# Patient Record
Sex: MALE | Race: WHITE | NOT HISPANIC OR LATINO | Employment: FULL TIME | URBAN - METROPOLITAN AREA
[De-identification: names, ages, dates, MRNs, and addresses within clinical notes are randomized per-mention and may not be internally consistent; named-entity substitution may affect disease eponyms.]

---

## 2024-04-15 PROBLEM — F32.A DEPRESSION: Status: ACTIVE | Noted: 2024-04-15

## 2024-04-15 PROBLEM — R10.9 LEFT FLANK PAIN: Status: ACTIVE | Noted: 2024-04-15

## 2024-04-15 PROBLEM — S61.213A LACERATION OF LEFT MIDDLE FINGER WITHOUT FOREIGN BODY WITHOUT DAMAGE TO NAIL: Status: ACTIVE | Noted: 2024-04-15

## 2024-04-15 PROBLEM — S67.22XA: Status: ACTIVE | Noted: 2024-04-15

## 2024-04-15 PROBLEM — M54.81 OCCIPITAL NEURALGIA OF RIGHT SIDE: Status: ACTIVE | Noted: 2024-04-15

## 2024-04-15 PROBLEM — R53.83 FATIGUE: Status: ACTIVE | Noted: 2024-04-15

## 2024-04-15 PROBLEM — R05.9 COUGH: Status: ACTIVE | Noted: 2024-04-15

## 2024-04-15 PROBLEM — R68.82 LOW LIBIDO: Status: ACTIVE | Noted: 2024-04-15

## 2024-04-15 PROBLEM — R29.818 SUSPECTED SLEEP APNEA: Status: ACTIVE | Noted: 2024-04-15

## 2024-04-15 PROBLEM — E78.00 HYPERCHOLESTEREMIA: Status: ACTIVE | Noted: 2024-04-15

## 2024-04-15 PROBLEM — S99.922A FOOT INJURY, LEFT, INITIAL ENCOUNTER: Status: ACTIVE | Noted: 2024-04-15

## 2024-04-15 RX ORDER — TADALAFIL 20 MG/1
TABLET ORAL
COMMUNITY
Start: 2021-11-23

## 2024-04-15 RX ORDER — ONDANSETRON 4 MG/1
TABLET, FILM COATED ORAL EVERY 8 HOURS
COMMUNITY
Start: 2015-03-05

## 2024-04-15 RX ORDER — MUPIROCIN 20 MG/G
OINTMENT TOPICAL 3 TIMES DAILY
COMMUNITY
Start: 2020-07-30

## 2024-04-15 RX ORDER — BUPROPION HYDROCHLORIDE 300 MG/1
1 TABLET ORAL DAILY
COMMUNITY
Start: 2018-12-11

## 2024-04-15 RX ORDER — TAMSULOSIN HYDROCHLORIDE 0.4 MG/1
1 CAPSULE ORAL DAILY
COMMUNITY
Start: 2020-03-12

## 2024-04-16 ENCOUNTER — OFFICE VISIT (OUTPATIENT)
Dept: PRIMARY CARE | Facility: CLINIC | Age: 54
End: 2024-04-16
Payer: COMMERCIAL

## 2024-04-16 VITALS
OXYGEN SATURATION: 97 % | DIASTOLIC BLOOD PRESSURE: 86 MMHG | SYSTOLIC BLOOD PRESSURE: 132 MMHG | TEMPERATURE: 97.7 F | WEIGHT: 218 LBS | BODY MASS INDEX: 27.98 KG/M2 | HEART RATE: 103 BPM | HEIGHT: 74 IN

## 2024-04-16 DIAGNOSIS — L02.415 ABSCESS OF RIGHT THIGH: ICD-10-CM

## 2024-04-16 DIAGNOSIS — L03.90 CELLULITIS, UNSPECIFIED CELLULITIS SITE: Primary | ICD-10-CM

## 2024-04-16 PROCEDURE — 87070 CULTURE OTHR SPECIMN AEROBIC: CPT

## 2024-04-16 PROCEDURE — 10060 I&D ABSCESS SIMPLE/SINGLE: CPT | Performed by: FAMILY MEDICINE

## 2024-04-16 PROCEDURE — 87075 CULTR BACTERIA EXCEPT BLOOD: CPT

## 2024-04-16 PROCEDURE — 87205 SMEAR GRAM STAIN: CPT

## 2024-04-16 PROCEDURE — 99213 OFFICE O/P EST LOW 20 MIN: CPT | Performed by: FAMILY MEDICINE

## 2024-04-16 RX ORDER — DOXYCYCLINE 100 MG/1
100 CAPSULE ORAL 2 TIMES DAILY
Qty: 28 CAPSULE | Refills: 0 | Status: SHIPPED | OUTPATIENT
Start: 2024-04-16 | End: 2024-04-30

## 2024-04-16 ASSESSMENT — ENCOUNTER SYMPTOMS
HEMATURIA: 0
VOMITING: 0
NAUSEA: 0
CONFUSION: 0
DIZZINESS: 0
HEADACHES: 0
DIARRHEA: 0
COUGH: 0
SORE THROAT: 0
FREQUENCY: 0
FEVER: 0
PALPITATIONS: 0
UNEXPECTED WEIGHT CHANGE: 0
BLOOD IN STOOL: 0
EYE PAIN: 0
TROUBLE SWALLOWING: 0
ABDOMINAL PAIN: 0
NUMBNESS: 0
DYSURIA: 0
FATIGUE: 0
WEAKNESS: 0
JOINT SWELLING: 0
HALLUCINATIONS: 0
SHORTNESS OF BREATH: 0
DECREASED CONCENTRATION: 0

## 2024-04-16 NOTE — PROGRESS NOTES
Subjective   Patient ID: Tj Aiken is a 53 y.o. male.    Patient comes in today with 2 lesions to evaluate.  One is on the left lower leg, laterally and it is smaller, and a larger lesion in the left proximal medial thigh.  It has been about 4 days.  There is some pus and drainage and the lesion on the right thigh is developing erythema around it.  He has no fever or chills and he is not diabetic.  He works as a /paramedic.        Review of Systems   Constitutional:  Negative for fatigue, fever and unexpected weight change.   HENT:  Negative for congestion, ear pain, hearing loss, sore throat and trouble swallowing.    Eyes:  Negative for pain and visual disturbance.   Respiratory:  Negative for cough and shortness of breath.    Cardiovascular:  Negative for chest pain, palpitations and leg swelling.   Gastrointestinal:  Negative for abdominal pain, blood in stool, diarrhea, nausea and vomiting.   Genitourinary:  Negative for dysuria, frequency, hematuria and urgency.   Musculoskeletal:  Negative for joint swelling.   Skin:  Negative for pallor and rash.   Neurological:  Negative for dizziness, syncope, weakness, numbness and headaches.   Psychiatric/Behavioral:  Negative for confusion, decreased concentration, hallucinations and suicidal ideas.      Vitals:    04/16/24 1038   BP: 132/86   Pulse: 103   Temp: 36.5 °C (97.7 °F)   SpO2: 97%      Objective   Physical Exam  Constitutional:       General: He is not in acute distress.     Appearance: Normal appearance. He is not toxic-appearing or diaphoretic.   Musculoskeletal:        Legs:       Comments: Right upper proximal inner thigh with about 2 cm in diameter annular lesion with necrotic borders and yellow necrotic material centrally.  There is a very small satellite lesion inferior to it that is just a few millimeters.  Both areas were cleaned with Betadine x 3, injected with lidocaine with epinephrine and debrided both wounds.  The larger wound  was about a centimeter deep.  Good granulation tissue was exposed and a wet-to-dry dressing was placed.  Left lower leg was debrided did not need packing.  Bandage was placed over it.  Demonstrated wet-to-dry dressing changes.  Also to note there was about a 6 cm rim of erythema around the right upper thigh lesion.  It was indurated   Neurological:      Mental Status: He is alert.         Assessment/Plan   Diagnoses and all orders for this visit:  Cellulitis, unspecified cellulitis site  -     doxycycline (Vibramycin) 100 mg capsule; Take 1 capsule (100 mg) by mouth 2 times a day for 14 days. Take with at least 8 ounces (large glass) of water, do not lie down for 30 minutes after  -     Tissue/Wound Culture/Smear

## 2024-04-16 NOTE — PATIENT INSTRUCTIONS
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  Soap and water to the wound and he will do wet-to-dry packings once to twice a day depending on the amount of necrotic material obtained.  There is quite a bit he can do it twice daily.  He was given materials to take home.  Given instructions that if it gets worse he is to let me know and we will get him off to wound care or surgery.

## 2024-04-17 DIAGNOSIS — L03.818 CELLULITIS OF OTHER SPECIFIED SITE: Primary | ICD-10-CM

## 2024-04-17 RX ORDER — AMOXICILLIN AND CLAVULANATE POTASSIUM 875; 125 MG/1; MG/1
875 TABLET, FILM COATED ORAL 2 TIMES DAILY
Qty: 20 TABLET | Refills: 0 | Status: SHIPPED | OUTPATIENT
Start: 2024-04-17 | End: 2024-04-27

## 2024-04-18 LAB
BACTERIA SPEC CULT: ABNORMAL
GRAM STN SPEC: ABNORMAL
GRAM STN SPEC: ABNORMAL

## 2024-04-24 ENCOUNTER — OFFICE VISIT (OUTPATIENT)
Dept: PRIMARY CARE | Facility: CLINIC | Age: 54
End: 2024-04-24
Payer: COMMERCIAL

## 2024-04-24 VITALS
DIASTOLIC BLOOD PRESSURE: 70 MMHG | BODY MASS INDEX: 27.59 KG/M2 | WEIGHT: 215 LBS | HEIGHT: 74 IN | SYSTOLIC BLOOD PRESSURE: 120 MMHG | TEMPERATURE: 96.2 F | HEART RATE: 62 BPM

## 2024-04-24 DIAGNOSIS — L03.115 CELLULITIS OF RIGHT LOWER EXTREMITY: Primary | ICD-10-CM

## 2024-04-24 PROBLEM — S99.922A FOOT INJURY, LEFT, INITIAL ENCOUNTER: Status: RESOLVED | Noted: 2024-04-15 | Resolved: 2024-04-24

## 2024-04-24 PROBLEM — S67.22XA: Status: RESOLVED | Noted: 2024-04-15 | Resolved: 2024-04-24

## 2024-04-24 ASSESSMENT — ENCOUNTER SYMPTOMS
NUMBNESS: 0
EYE PAIN: 0
CONFUSION: 0
PALPITATIONS: 0
WEAKNESS: 0
FEVER: 0
SHORTNESS OF BREATH: 0
DECREASED CONCENTRATION: 0
ABDOMINAL PAIN: 0
DIZZINESS: 0
HEADACHES: 0
NAUSEA: 0
COUGH: 0
VOMITING: 0
DIARRHEA: 0
TROUBLE SWALLOWING: 0
DYSURIA: 0
SORE THROAT: 0
UNEXPECTED WEIGHT CHANGE: 0
HEMATURIA: 0
HALLUCINATIONS: 0
JOINT SWELLING: 0
FATIGUE: 0
FREQUENCY: 0
BLOOD IN STOOL: 0

## 2024-04-24 ASSESSMENT — PAIN SCALES - GENERAL: PAINLEVEL: 0-NO PAIN

## 2024-04-24 NOTE — PATIENT INSTRUCTIONS
Wounds are doing much better.  Continue wet-to-dry.  Continue antibiotics until finished and I will recheck in 1 week.  
98.6

## 2024-04-24 NOTE — PROGRESS NOTES
Subjective   Patient ID: Tj Aiken is a 53 y.o. male.    Patient comes in today for wound check.  He came in last week with a group A strep cellulitis and abscess.  He is on doxycycline and Augmentin.  He is doing wet-to-dry dressings daily.  He has a right larger inner thigh lesion and a left calf lesion, please see previous note.  They are now bleeding and he has been controlling the pus and necrotic tissue formation.  No fever.  He is not a smoker.        Review of Systems   Constitutional:  Negative for fatigue, fever and unexpected weight change.   HENT:  Negative for congestion, ear pain, hearing loss, sore throat and trouble swallowing.    Eyes:  Negative for pain and visual disturbance.   Respiratory:  Negative for cough and shortness of breath.    Cardiovascular:  Negative for chest pain, palpitations and leg swelling.   Gastrointestinal:  Negative for abdominal pain, blood in stool, diarrhea, nausea and vomiting.   Genitourinary:  Negative for dysuria, frequency, hematuria and urgency.   Musculoskeletal:  Negative for joint swelling.   Skin:  Negative for pallor and rash.   Neurological:  Negative for dizziness, syncope, weakness, numbness and headaches.   Psychiatric/Behavioral:  Negative for confusion, decreased concentration, hallucinations and suicidal ideas.      Vitals:    04/24/24 1035   BP: 120/70   Pulse: 62   Temp: 35.7 °C (96.2 °F)      Objective   Physical Exam  Constitutional:       Appearance: Normal appearance. He is normal weight.   Skin:     Comments: Left lower leg lesion is without any drainage.  There is good granulation tissue and it is much shallower.  Right proximal inner thigh lesion is slightly cavitated with good granulation tissue and no pus or drainage.  The surrounding skin is no longer indurated and red but it is somewhat bruised.   Neurological:      Mental Status: He is alert.         Assessment/Plan   There are no diagnoses linked to this encounter.

## 2024-05-01 ENCOUNTER — PROCEDURE VISIT (OUTPATIENT)
Dept: PRIMARY CARE | Facility: CLINIC | Age: 54
End: 2024-05-01
Payer: COMMERCIAL

## 2024-05-01 DIAGNOSIS — S71.101S OPEN WOUND OF RIGHT THIGH, SEQUELA: Primary | ICD-10-CM

## 2024-05-01 PROCEDURE — 99213 OFFICE O/P EST LOW 20 MIN: CPT | Performed by: FAMILY MEDICINE

## 2024-05-03 PROBLEM — L02.415 ABSCESS OF RIGHT THIGH: Status: RESOLVED | Noted: 2024-04-16 | Resolved: 2024-05-03

## 2024-05-03 PROBLEM — R05.9 COUGH: Status: RESOLVED | Noted: 2024-04-15 | Resolved: 2024-05-03

## 2024-05-03 ASSESSMENT — ENCOUNTER SYMPTOMS
TROUBLE SWALLOWING: 0
DIARRHEA: 0
HEADACHES: 0
CONFUSION: 0
HALLUCINATIONS: 0
COUGH: 0
FREQUENCY: 0
ABDOMINAL PAIN: 0
VOMITING: 0
NUMBNESS: 0
FATIGUE: 0
SHORTNESS OF BREATH: 0
FEVER: 0
DIZZINESS: 0
PALPITATIONS: 0
UNEXPECTED WEIGHT CHANGE: 0
SORE THROAT: 0
NAUSEA: 0
DECREASED CONCENTRATION: 0
BLOOD IN STOOL: 0
JOINT SWELLING: 0
EYE PAIN: 0
WEAKNESS: 0
HEMATURIA: 0
DYSURIA: 0

## 2024-05-03 NOTE — PROGRESS NOTES
Subjective   Patient ID: Tj Aiken is a 53 y.o. male.    Patient comes in today for recheck on 2 wounds.  He has a wound on his right upper medial thigh and 1 on the left lateral lower leg.  They initially had pus and grew out group A strep.  He has been on Augmentin and doxycycline.  The left lower leg lesion has a scab with no fluctuance or pus and is healing well per his report.  He has been doing wet-to-dry dressings on the right upper thigh lesion.  There is no pus and it is bleeding.  The cavernous area has filled in there is no scab.  No fever or chills.  Minimal drainage.        Review of Systems   Constitutional:  Negative for fatigue, fever and unexpected weight change.   HENT:  Negative for congestion, ear pain, hearing loss, sore throat and trouble swallowing.    Eyes:  Negative for pain and visual disturbance.   Respiratory:  Negative for cough and shortness of breath.    Cardiovascular:  Negative for chest pain, palpitations and leg swelling.   Gastrointestinal:  Negative for abdominal pain, blood in stool, diarrhea, nausea and vomiting.   Genitourinary:  Negative for dysuria, frequency, hematuria and urgency.   Musculoskeletal:  Negative for joint swelling.   Skin:  Negative for pallor and rash.   Neurological:  Negative for dizziness, syncope, weakness, numbness and headaches.   Psychiatric/Behavioral:  Negative for confusion, decreased concentration, hallucinations and suicidal ideas.      There were no vitals filed for this visit.   Objective   Physical Exam  Constitutional:       Appearance: Normal appearance.   Skin:            Comments: Left lower leg lesion is healing.  There is a small eschar about 5 x 6 mm, minimal erythema around it, no induration, no pus and no fluctuance.  Right medial thigh lesion is around the same size at about just under 2 cm.  There is granulation tissue but there is no signs of epithelialization or scabbing over the wound that is now flat.   Neurological:       Mental Status: He is alert.         Assessment/Plan   There are no diagnoses linked to this encounter.

## 2024-05-03 NOTE — PATIENT INSTRUCTIONS
Discussed that we will let it air out and hopefully there will be some scab formation which will retract the edges.  If this does not happen over the next week or 2 he may need to get into plastics for another option.  Left leg is healing well.

## 2024-05-08 ENCOUNTER — OFFICE VISIT (OUTPATIENT)
Dept: PRIMARY CARE | Facility: CLINIC | Age: 54
End: 2024-05-08
Payer: COMMERCIAL

## 2024-05-08 VITALS
DIASTOLIC BLOOD PRESSURE: 78 MMHG | SYSTOLIC BLOOD PRESSURE: 100 MMHG | TEMPERATURE: 98.1 F | HEART RATE: 92 BPM | OXYGEN SATURATION: 97 %

## 2024-05-08 DIAGNOSIS — F32.A DEPRESSION, UNSPECIFIED DEPRESSION TYPE: ICD-10-CM

## 2024-05-08 DIAGNOSIS — L03.115 CELLULITIS OF RIGHT LOWER EXTREMITY: Primary | ICD-10-CM

## 2024-05-08 PROBLEM — S60.229A CONTUSION OF HAND: Status: ACTIVE | Noted: 2024-05-08

## 2024-05-08 PROBLEM — S92.353A CLOSED FRACTURE OF FIFTH METATARSAL BONE: Status: ACTIVE | Noted: 2024-05-08

## 2024-05-08 PROCEDURE — 99213 OFFICE O/P EST LOW 20 MIN: CPT | Performed by: FAMILY MEDICINE

## 2024-05-08 ASSESSMENT — ENCOUNTER SYMPTOMS
WEAKNESS: 0
NAUSEA: 0
FEVER: 0
COUGH: 0
VOMITING: 0
DECREASED CONCENTRATION: 0
EYE PAIN: 0
PALPITATIONS: 0
HEMATURIA: 0
FREQUENCY: 0
TROUBLE SWALLOWING: 0
DYSURIA: 0
UNEXPECTED WEIGHT CHANGE: 0
HEADACHES: 0
DYSPHORIC MOOD: 1
BLOOD IN STOOL: 0
HALLUCINATIONS: 0
ABDOMINAL PAIN: 0
SHORTNESS OF BREATH: 0
DIZZINESS: 0
CONFUSION: 0
DIARRHEA: 0
NUMBNESS: 0
SORE THROAT: 0
JOINT SWELLING: 0
FATIGUE: 0

## 2024-05-08 ASSESSMENT — PAIN SCALES - GENERAL: PAINLEVEL: 0-NO PAIN

## 2024-05-08 NOTE — PROGRESS NOTES
Subjective   Patient ID: Tj Aiken is a 53 y.o. male.    Here for wound debridment. Is doing well but with necrotic tissue surrounding. No pus. No fever. Some depression. Counselor name given.  Declined meds.        Review of Systems   Constitutional:  Negative for fatigue, fever and unexpected weight change.   HENT:  Negative for congestion, ear pain, hearing loss, sore throat and trouble swallowing.    Eyes:  Negative for pain and visual disturbance.   Respiratory:  Negative for cough and shortness of breath.    Cardiovascular:  Negative for chest pain, palpitations and leg swelling.   Gastrointestinal:  Negative for abdominal pain, blood in stool, diarrhea, nausea and vomiting.   Genitourinary:  Negative for dysuria, frequency, hematuria and urgency.   Musculoskeletal:  Negative for joint swelling.   Skin:  Negative for pallor and rash.   Neurological:  Negative for dizziness, syncope, weakness, numbness and headaches.   Psychiatric/Behavioral:  Positive for dysphoric mood. Negative for confusion, decreased concentration, hallucinations and suicidal ideas.      Vitals:    05/08/24 1006   BP: 100/78   Pulse: 92   Temp: 36.7 °C (98.1 °F)   SpO2: 97%      Objective   Physical Exam  Constitutional:       Appearance: Normal appearance. He is normal weight.   Skin:            Comments: Annular lesion, about 1 cm in diam. Rim of dark tissue and scab removed after cleaning with betadine. Granulation tissue centrally. No necrosis.   Neurological:      Mental Status: He is alert.         Assessment/Plan   There are no diagnoses linked to this encounter.

## 2024-05-08 NOTE — PATIENT INSTRUCTIONS
Much improved  Hopefully will continue to close after debridment.\  Wound covered with non stick bandage.  Will follow up if doesn't close, may need wound care  Consider antidepressants, will call counselor

## 2024-08-22 ENCOUNTER — OFFICE VISIT (OUTPATIENT)
Dept: PRIMARY CARE | Facility: CLINIC | Age: 54
End: 2024-08-22
Payer: COMMERCIAL

## 2024-08-22 ENCOUNTER — HOSPITAL ENCOUNTER (OUTPATIENT)
Dept: RADIOLOGY | Facility: CLINIC | Age: 54
Discharge: HOME | End: 2024-08-22
Payer: COMMERCIAL

## 2024-08-22 VITALS
DIASTOLIC BLOOD PRESSURE: 80 MMHG | SYSTOLIC BLOOD PRESSURE: 122 MMHG | WEIGHT: 222 LBS | BODY MASS INDEX: 28.5 KG/M2 | TEMPERATURE: 97.2 F | HEART RATE: 73 BPM | OXYGEN SATURATION: 98 %

## 2024-08-22 DIAGNOSIS — S69.91XA INJURY OF FINGER OF RIGHT HAND, INITIAL ENCOUNTER: ICD-10-CM

## 2024-08-22 DIAGNOSIS — S69.91XA INJURY OF FINGER OF RIGHT HAND, INITIAL ENCOUNTER: Primary | ICD-10-CM

## 2024-08-22 PROCEDURE — 99213 OFFICE O/P EST LOW 20 MIN: CPT | Performed by: FAMILY MEDICINE

## 2024-08-22 PROCEDURE — 73140 X-RAY EXAM OF FINGER(S): CPT | Mod: RT

## 2024-08-22 ASSESSMENT — PATIENT HEALTH QUESTIONNAIRE - PHQ9
SUM OF ALL RESPONSES TO PHQ9 QUESTIONS 1 AND 2: 0
2. FEELING DOWN, DEPRESSED OR HOPELESS: NOT AT ALL
1. LITTLE INTEREST OR PLEASURE IN DOING THINGS: NOT AT ALL

## 2024-08-22 ASSESSMENT — PAIN SCALES - GENERAL: PAINLEVEL: 3

## 2024-08-22 NOTE — PROGRESS NOTES
Subjective   Patient ID: Tj Aiken is a 53 y.o. male.    Patient comes in today because his right middle finger became painful, swollen and limited motion after a hockey game about a week ago.  There was no specific injury that he is aware of.         Review of Systems   All other systems reviewed and are negative.    Vitals:    08/22/24 0804   BP: 122/80   Pulse: 73   Temp: 36.2 °C (97.2 °F)   SpO2: 98%      Objective   Physical Exam  Constitutional:       Appearance: Normal appearance.   Musculoskeletal:      Comments: Right PIP enlarged, tender around the joint and laterally.  No dislocation.  Pain with lateral movement.  Can flex about 45 degrees from anatomical position.   Neurological:      Mental Status: He is alert.         Assessment/Plan   Diagnoses and all orders for this visit:  Injury of finger of right hand, initial encounter  -     XR fingers right 2+ views; Future

## 2024-08-22 NOTE — PATIENT INSTRUCTIONS
Will check an x-ray to rule out bony injury.  If unremarkable may want to consider orthopedic consult to make sure you do not disrupt any tendons or ligaments that could be permanent.  NSAIDs as needed.  Work on flexion.

## 2024-08-28 ENCOUNTER — OFFICE VISIT (OUTPATIENT)
Dept: PRIMARY CARE | Facility: CLINIC | Age: 54
End: 2024-08-28
Payer: COMMERCIAL

## 2024-08-28 VITALS
HEIGHT: 74 IN | TEMPERATURE: 97.8 F | WEIGHT: 221 LBS | SYSTOLIC BLOOD PRESSURE: 124 MMHG | DIASTOLIC BLOOD PRESSURE: 84 MMHG | OXYGEN SATURATION: 96 % | HEART RATE: 71 BPM | BODY MASS INDEX: 28.36 KG/M2

## 2024-08-28 DIAGNOSIS — M25.512 CHRONIC LEFT SHOULDER PAIN: Primary | ICD-10-CM

## 2024-08-28 DIAGNOSIS — G89.29 CHRONIC LEFT SHOULDER PAIN: Primary | ICD-10-CM

## 2024-08-28 PROCEDURE — 99212 OFFICE O/P EST SF 10 MIN: CPT | Performed by: FAMILY MEDICINE

## 2024-08-28 PROCEDURE — 20610 DRAIN/INJ JOINT/BURSA W/O US: CPT | Performed by: FAMILY MEDICINE

## 2024-08-28 PROCEDURE — 3008F BODY MASS INDEX DOCD: CPT | Performed by: FAMILY MEDICINE

## 2024-08-28 RX ORDER — TRIAMCINOLONE ACETONIDE 40 MG/ML
40 INJECTION, SUSPENSION INTRA-ARTICULAR; INTRAMUSCULAR ONCE
Status: COMPLETED | OUTPATIENT
Start: 2024-08-28 | End: 2024-08-28

## 2024-08-28 ASSESSMENT — ENCOUNTER SYMPTOMS
TROUBLE SWALLOWING: 0
DIZZINESS: 0
NAUSEA: 0
ABDOMINAL PAIN: 0
DIARRHEA: 0
NUMBNESS: 0
COUGH: 0
WEAKNESS: 0
PALPITATIONS: 0
HEADACHES: 0
FREQUENCY: 0
HEMATURIA: 0
SORE THROAT: 0
JOINT SWELLING: 0
VOMITING: 0
DECREASED CONCENTRATION: 0
EYE PAIN: 0
FEVER: 0
CONFUSION: 0
DYSURIA: 0
SHORTNESS OF BREATH: 0
HALLUCINATIONS: 0
UNEXPECTED WEIGHT CHANGE: 0
BLOOD IN STOOL: 0
FATIGUE: 0
ARTHRALGIAS: 1

## 2024-08-28 NOTE — PROGRESS NOTES
Subjective   Patient ID: Tj Aiken is a 53 y.o. male.    Patient with left shoulder pain for months.  He has a physically strenuous job.  He has had his right shoulder operated on for anterior labrum issue and rotator cuff disease.  His left arm is painful with abduction and internal rotation and he is getting a little bit weak.  With regard to previous visit his finger has more range of motion.  There was no clear trauma on the left.        Review of Systems   Constitutional:  Negative for fatigue, fever and unexpected weight change.   HENT:  Negative for congestion, ear pain, hearing loss, sore throat and trouble swallowing.    Eyes:  Negative for pain and visual disturbance.   Respiratory:  Negative for cough and shortness of breath.    Cardiovascular:  Negative for chest pain, palpitations and leg swelling.   Gastrointestinal:  Negative for abdominal pain, blood in stool, diarrhea, nausea and vomiting.   Genitourinary:  Negative for dysuria, frequency, hematuria and urgency.   Musculoskeletal:  Positive for arthralgias. Negative for joint swelling.   Skin:  Negative for pallor and rash.   Neurological:  Negative for dizziness, syncope, weakness, numbness and headaches.   Psychiatric/Behavioral:  Negative for confusion, decreased concentration, hallucinations and suicidal ideas.      Vitals:    08/28/24 1053   BP: 124/84   Pulse: 71   Temp: 36.6 °C (97.8 °F)   SpO2: 96%      Objective   Physical Exam  Constitutional:       Appearance: Normal appearance.   Musculoskeletal:      Comments: Limited abduction L shoulder. Limited internal rotation. Weakness of cuff muscles, no deformity, no erythema.   After obtaining written and verbal consent lateral L sub-acromial area cleaned with betadine x3. Injected with soln of 4cc bupivicaine, 4cc lido 1% without epinephrine, 1cc kenalog (40mg) with 22g 1 1/2 inch needle. Patient tolerated well.     Neurological:      Mental Status: He is alert.         Assessment/Plan    Diagnoses and all orders for this visit:  Chronic left shoulder pain  -     Referral to Physical Therapy; Future

## 2024-08-28 NOTE — PATIENT INSTRUCTIONS
Discussed options  Most likely rotator cuff tendinopathy  Injected shoulder without difficulty  Send to PT  If no better then MRI and ortho referral

## 2024-09-09 ENCOUNTER — EVALUATION (OUTPATIENT)
Dept: PHYSICAL THERAPY | Facility: CLINIC | Age: 54
End: 2024-09-09
Payer: COMMERCIAL

## 2024-09-09 DIAGNOSIS — M25.512 CHRONIC LEFT SHOULDER PAIN: ICD-10-CM

## 2024-09-09 DIAGNOSIS — G89.29 CHRONIC LEFT SHOULDER PAIN: ICD-10-CM

## 2024-09-09 PROCEDURE — 97530 THERAPEUTIC ACTIVITIES: CPT | Mod: GP | Performed by: PHYSICAL THERAPIST

## 2024-09-09 PROCEDURE — 97161 PT EVAL LOW COMPLEX 20 MIN: CPT | Mod: GP | Performed by: PHYSICAL THERAPIST

## 2024-09-09 ASSESSMENT — PAIN - FUNCTIONAL ASSESSMENT: PAIN_FUNCTIONAL_ASSESSMENT: 0-10

## 2024-09-09 ASSESSMENT — PAIN SCALES - GENERAL: PAINLEVEL_OUTOF10: 5 - MODERATE PAIN

## 2024-09-09 NOTE — PROGRESS NOTES
Physical Therapy Evaluation and Treatment      Patient Name: Tj Aiken  MRN: [id number]  Today's Date: 9/9/2024  Time Calculation  Start Time: 1055  Stop Time: 1125  Time Calculation (min): 30 min  PT Therapeutic Procedures Time Entry  Therapeutic Activity Time Entry: 10  Low complexity due to patient's clinical presentation being stable and uncomplicated by any significant comorbidities that may affect rehab tolerance and progression.    Insurance:  Visit number: 1 of 7  Authorization info: NO AUTH / [place]  Insurance Type: Payor: [employer] [facility] / Plan: [employer] / Product Type: *No Product type* /     Current Problem:   1. Chronic left shoulder pain  Referral to Physical Therapy    Follow Up In Physical Therapy          Subjective    General:  General  Reason for Referral: L GH pain  Referred By: Dr.l Gibson  General Comment: Pt presents to therapy with L GH pain which began a couple months ago. He has a history of R RTC and labral surgery and is concerned that the L GH is the same. He feels there is occasionally catching and extereme pain with shooting pain into L arm. He did get a cortisone shot and told him to come to therapy, he has not yet had imagine done in the shoulder yet. He feels the cortisone was somewhat helpful. He reports most of his pain is along mid/anterior delt region. He denies radicular symptoms. He does not report any reduced  strength. He does not report majority of pain at rest but will get some discomfort most of the pain is with motion.  Precautions:  Precautions  Precautions Comment: None  Pain:  Pain Assessment  Pain Assessment: 0-10  0-10 (Numeric) Pain Score: 5 - Moderate pain  Pain Type: Acute pain  Pain Location: Shoulder  Pain Orientation: Left  Home Living:   Works as a    Prior Level of Function:  Prior Function Per Pt/Caregiver Report  Level of [place]: Independent with ADLs and functional transfers, Independent with homemaking  with ambulation    Objective     Shoulder    Observation  Shoulder Observation Comment: L forward shoulder position with slight scapular elevation  Shoulder palpation/Joint Assessment  Shoulder Palpation/Joint Mobility Comment: L infraspinatus; L pec; L supraspinatus distal; L bicep proximal; L GH hypomobility  Cervical AROM  Cervical AROM WFL: yes  Shoulder AROM  L Shoulder flexion: (180°): 132  L Shoulder Extension: (60°): WNL  L Shoulder abduction: (180°): 142  L shoulder ER: (90°): 32  L shoulder IR: (70°): WNL  Shoulder PROM  Shoulder PROM WFL: yes (Pain remains in abduction and ER)    Scapular MMT  Scapular MMT WFL:  (Scapular compensation present suggestive of posterior chain weakenss, did not formally assess will perform at later date)    Shoulder Special  Painful arc: Negative: negative  Infraspinatus MMT: Negative: negative  Drop Arm Test: Negative: negative  UE Special Tests Comments: Testing was limited secondary to pain and muscle guarding      Outcome Measures:  Other Measures  Other Outcome Measures: UEFS 74/80     Treatments:  Therapeutic Activity  Therapeutic Activity Performed: Yes  Therapeutic Activity 1: KT tape applied L GH  Therapeutic Activity 2: Discussed anatomy in relation to findings and compensation during HEP along with postural control  Therapeutic Activity 3: Access Code [email or web address]  - Seated Scapular Retraction  - 1 x daily - 7 x weekly - 3 sets - 10 reps  - Standing Shoulder Row with Anchored Resistance  - 1 x daily - 7 x weekly - 3 sets - 10 reps  - Shoulder External Rotation with Anchored Resistance  - 1 x daily - 7 x weekly - 3 sets - 10 reps  - Shoulder Internal Rotation with Resistance  - 1 x daily - 7 x weekly - 3 sets - 10 reps    Assessment   Assessment:  PT Assessment Results: Decreased strength, Decreased range of motion, Decreased endurance, Pain  Rehab Prognosis: Good  Assessment Comment: Pt is a 53 y.o male presenting to therapy with subacute L GH pain. Pt found to have  reduced seated AROM in L GH compared to full ROM with supine AAROM/PROM suggestive of scapular involvement with muscle imbalance. Tenderness throughout anterior GH region with poor postural control and forward shoulder position creating incresaed strain throughout RTC musculature. Strength deficits present throughout with pain further suggesting weakness and potential RTC tendon irritation. Guarding present during joint mobility and special test with finding of hypomobility and negative testing however will need to assess further as pain levels reduce due to compensations. At this time pt would benefit from skilled physical therapy in order to improve ROM and scapular control with good strength to prevent further injury or need for surgery.    Plan:  Treatment/Interventions: Dry needling, Cryotherapy, Education/ Instruction, Hot pack, Manual therapy, Neuromuscular re-education, Taping techniques, Therapeutic activities, Therapeutic exercises  PT Plan: Skilled PT  PT Frequency: 1 time per week  Duration: 6 weeks + eval  Onset Date: 09/09/24  Certification Period Start Date: 09/09/24  Certification Period End Date: 12/08/24  Number of Treatments Authorized: [place]  Rehab Potential: Good  Plan of Care Agreement: Patient    OP EDUCATION:  Outpatient Education  Individual(s) Educated: Patient  Education Provided: Anatomy, Home Safety, POC  Risk and Benefits Discussed with Patient/Caregiver/Other: yes  Patient/Caregiver Demonstrated Understanding: yes  Plan of Care Discussed and Agreed Upon: yes  Patient Response to Education: Patient/Caregiver Verbalized Understanding of Information, Patient/Caregiver Performed Return Demonstration of Exercises/Activities, Patient/Caregiver Asked Appropriate Questions    Goals:  Active       PT Problem       PT Goal 1       Start:  09/09/24    Expected End:  11/08/24       Pt will be 100% IND with HEP in 6 weeks in order to maintain progress with therapy.           PT Goal 2       Start:   09/09/24    Expected End:  11/08/24       Pt will demonstrated improve AROM of L GH as follows: flexion 160. Abduction 160, ER functional C5 in 26 weeks to improve mobility required for dressing, bathing, cooking and cleaning.          PT Goal 3       Start:  09/09/24    Expected End:  11/08/24       Pt will improve L GH strength to 5/5 in 6 weeks in order to improve strength required to lift objects at home including groceries and to improve cleaning tasks.           PT Goal 4       Start:  09/09/24    Expected End:  11/08/24       Pt will reduce pain levels to no more than 0/10 in 6 weeks in order to improve higher level work related tasks          PT Goal 5       Start:  09/09/24    Expected End:  11/08/24       Pt will demonstrate subjective improvement of ADLs and recreational activities through improved score of 80 on UEFS in 6 weeks for full return to work related tasks.

## 2024-09-16 ENCOUNTER — APPOINTMENT (OUTPATIENT)
Dept: PHYSICAL THERAPY | Facility: CLINIC | Age: 54
End: 2024-09-16
Payer: COMMERCIAL

## 2024-09-23 ENCOUNTER — APPOINTMENT (OUTPATIENT)
Dept: PHYSICAL THERAPY | Facility: CLINIC | Age: 54
End: 2024-09-23
Payer: COMMERCIAL

## 2024-09-30 ENCOUNTER — TREATMENT (OUTPATIENT)
Dept: PHYSICAL THERAPY | Facility: CLINIC | Age: 54
End: 2024-09-30
Payer: COMMERCIAL

## 2024-09-30 DIAGNOSIS — G89.29 CHRONIC LEFT SHOULDER PAIN: ICD-10-CM

## 2024-09-30 DIAGNOSIS — M25.512 CHRONIC LEFT SHOULDER PAIN: ICD-10-CM

## 2024-09-30 PROCEDURE — 97110 THERAPEUTIC EXERCISES: CPT | Mod: GP,CQ

## 2024-09-30 NOTE — PROGRESS NOTES
Physical Therapy Treatment    Patient Name: Tj Aiken  MRN: [id number]  Today's Date: 9/30/2024  Time Calculation  Start Time: 0800  Stop Time: 0840  Time Calculation (min): 40 min  PT Therapeutic Procedures Time Entry  Therapeutic Exercise Time Entry: 40    Insurance:  Visit number: 2 of 7  Authorization info: MMO - NO AUTH / [place] / DEDUCT $750 - $495 met / 90% COVERAGE / OOP $2250 - $595 met / AVAILITY [id number] / ds 9/6/24. GROUP YEAR - 4/1/23 - 3/31/24.  Insurance Type: Payor: [employer] [facility] / Plan: [employer] / Product Type: *No Product type* /     Current Problem   1. Chronic left shoulder pain  Follow Up In Physical Therapy          Subjective   General    Pt reports that the shoulder pain is about the same.  Precautions:   none  Pain    3 at rest  Post Treatment Pain Level same    Objective   Pt tested + for O'briens test on L  Difficulty with any movements above 90 degrees or reaching behind his back.  Treatments:  Therapeutic Exercise:   UBE  LA scap add with BTB x 30   S/L ER  without weight  SPO with #5 x 30  S/L ABD with #3 weight      Assessment   Assessment:    Pt was having difficulty with resistive ER. Needed to decrease resistances with there ex because of increased pain.    Plan:    Progress as able.    OP EDUCATION:       Goals:   Active       PT Problem       PT Goal 1       Start:  09/09/24    Expected End:  11/08/24       Pt will be 100% IND with HEP in 6 weeks in order to maintain progress with therapy.           PT Goal 2       Start:  09/09/24    Expected End:  11/08/24       Pt will demonstrated improve AROM of L GH as follows: flexion 160. Abduction 160, ER functional C5 in 26 weeks to improve mobility required for dressing, bathing, cooking and cleaning.          PT Goal 3       Start:  09/09/24    Expected End:  11/08/24       Pt will improve L GH strength to 5/5 in 6 weeks in order to improve strength required to lift objects at home including  groceries and to improve cleaning tasks.           PT Goal 4       Start:  09/09/24    Expected End:  11/08/24       Pt will reduce pain levels to no more than 0/10 in 6 weeks in order to improve higher level work related tasks          PT Goal 5       Start:  09/09/24    Expected End:  11/08/24       Pt will demonstrate subjective improvement of ADLs and recreational activities through improved score of 80 on UEFS in 6 weeks for full return to work related tasks.

## 2024-10-03 DIAGNOSIS — M25.511 CHRONIC RIGHT SHOULDER PAIN: Primary | ICD-10-CM

## 2024-10-03 DIAGNOSIS — G89.29 CHRONIC RIGHT SHOULDER PAIN: Primary | ICD-10-CM

## 2024-10-07 ENCOUNTER — APPOINTMENT (OUTPATIENT)
Dept: PHYSICAL THERAPY | Facility: CLINIC | Age: 54
End: 2024-10-07
Payer: COMMERCIAL

## 2024-10-14 ENCOUNTER — APPOINTMENT (OUTPATIENT)
Dept: PHYSICAL THERAPY | Facility: CLINIC | Age: 54
End: 2024-10-14
Payer: COMMERCIAL

## 2024-10-14 ENCOUNTER — APPOINTMENT (OUTPATIENT)
Dept: PRIMARY CARE | Facility: CLINIC | Age: 54
End: 2024-10-14
Payer: COMMERCIAL

## 2024-10-21 ENCOUNTER — APPOINTMENT (OUTPATIENT)
Dept: PHYSICAL THERAPY | Facility: CLINIC | Age: 54
End: 2024-10-21
Payer: COMMERCIAL

## 2024-11-03 NOTE — PROGRESS NOTES
Subjective   Patient ID: Tj Aiken is a 54 y.o. male    Chief Complaint:   Chief Complaint   Patient presents with    Left Shoulder - Pain        Last Surgery: No surgery found  Date of Last Surgery: No surgery found    HPI  Tj Aiken is a 54 y.o. right hand dominant male presenting for bilateral shoulder pain left greater than right.    This has been bothering him for the last couple of months. He cannot recall a specific inciting event. He wakes up at night due to pain. He had a cortisone injection with his PCP in 09/2024 that did not help. He attended several therapy sessions that only made his shoulder worse. He does note that 10 years ago he had a torn rotator cuff that was repaired with Dr. Colvin. He did not have this pain after surgery.     85% normal right shoulder. 50% normal left shoulder today.     Currently a .       Objective   Patient is a well-developed, well-nourished male  in no acute distress.  Breathes with normal chest rises.  Pupils are round and symmetric today.  Awake, alert, and oriented x3.      Examination of the left shoulder today reveals the skin to be intact. There is no sign of any atrophy, lesions, or abrasions. There is no pain to palpation of the bony prominences. Cervical lymphadenopathy examined, and this was negative. Patient had 5 out of 5 wrist flexion, extension, and thumb extension bilaterally. Sensation was intact to light touch to median, ulnar, radial axillary, and musculocutaneous nerves bilaterally. Positive radial pulse bilaterally. Provocative maneuvers on the left side today were negative.  Range of motion of the left shoulder revealed 0-90° of forward elevation, 0-10° of external rotation, and internal rotation was to his side.     Examination of the right shoulder today reveals the skin to be intact. There is no sign of any atrophy, lesions, or abrasions. There is no pain to palpation of the bony prominences. Cervical lymphadenopathy  examined, and this was negative. Patient had 5 out of 5 wrist flexion, extension, and thumb extension, bilaterally. Sensation was intact to light touch to median, ulnar, radial, axillary, and musculocutaneous nerves bilaterally. Positive radial pulse bilaterally. Provocative maneuvers on the right side today were negative.  Range of motion of the right shoulder revealed 0-170° of forward elevation, 0-90° of external rotation, and internal rotation up to T-12.    Imaging:  X-rays of the bilateral shoulders taken today personally ordered and interpreted by me show no signs of any fracture, dislocation, arthritis or proximal humerus migration. No os acromiale.     Assessment/Plan   Shoulder pain, unspecified chronicity, unspecified laterality    Chronic right shoulder pain  Patient with left frozen shoulder    At this time, we had a long discussion regarding their diagnosis. The natural history of frozen shoulder was discussed at length, including the fact that it resolves on its own over a 2-3-year time frame. Interventions for frozen shoulder include therapy, injections, and if that fails, arthroscopic release of the capsule. Operative intervention is reserved for those that cannot improve their range of motion or pain after 6-8 months of treatment.  We discussed the fact that a good analogy is the capsule acting like a Saran wrap. In the frozen shoulder, Saran wrap is shrunk by a hairdryer, and that is how the capsule acts in frozen shoulder, limiting shoulder motion in all planes.  We also discussed that patients are at increased risk for frozen shoulder if they have a family history of diabetes or thyroid disease.      In general most patients who come in with complaints such as these will get better with home therapy of stretching, NSAIDs and injections alone. Surgical intervention of arthroscopic release of the capsule is reserved for those individuals that do not improve their range of motion with conservative  approach or continue to have pain after 6-8 months of treatment.     We have given the patient our written provider directed home exercise program, along with correlating website for home therapy. The stretching therapy should be performed 2-3 times a day and should take about 10-15 minutes to perform each time. A physical therapy prescription, for stretching only and [facility] physical therapy locations was also given/offered to the patient today.     He tolerated the injection into the glenohumeral joint well today. We will see how they do after conservative management of therapy, OTC medications and injections. Usually patients feel better after 1 or 2 steroid injections.  It is rare that this diagnosis would require surgical intervention, but at times is warranted when conservative measures have failed over time. The most important variable is to keep the shoulder joint moving. If they do not get sustained relief, consideration for a repeat injection versus advanced imaging can be made. If symptoms improve, they can see us back on an as-needed basis.      They can be seen again in clinic in 12 weeks for a repeat clinical check if needed. If symptoms improve, they can see us back on an as-needed basis. All patient's questions were answered to their satisfaction today and they are comfortable with the above plan.      If he calls us and lets us know that he needs some time and limitations to work he will let us know.    Orders Placed This Encounter    XR shoulder right 2+ views    XR shoulder left 2+ views         Follow up in 12 weeks    Scribe Attestation  By signing my name below, Keiko LAUREANO Scribe   attest that this documentation has been prepared under the direction and in the presence of Christian Gomez MD.

## 2024-11-04 ENCOUNTER — HOSPITAL ENCOUNTER (OUTPATIENT)
Dept: RADIOLOGY | Facility: CLINIC | Age: 54
Discharge: HOME | End: 2024-11-04
Payer: COMMERCIAL

## 2024-11-04 ENCOUNTER — APPOINTMENT (OUTPATIENT)
Dept: ORTHOPEDIC SURGERY | Facility: CLINIC | Age: 54
End: 2024-11-04
Payer: COMMERCIAL

## 2024-11-04 DIAGNOSIS — M25.519 SHOULDER PAIN, UNSPECIFIED CHRONICITY, UNSPECIFIED LATERALITY: ICD-10-CM

## 2024-11-04 DIAGNOSIS — M75.02 ADHESIVE CAPSULITIS OF LEFT SHOULDER: Primary | ICD-10-CM

## 2024-11-04 DIAGNOSIS — G89.29 CHRONIC RIGHT SHOULDER PAIN: ICD-10-CM

## 2024-11-04 DIAGNOSIS — M25.511 CHRONIC RIGHT SHOULDER PAIN: ICD-10-CM

## 2024-11-04 PROCEDURE — 99213 OFFICE O/P EST LOW 20 MIN: CPT | Performed by: ORTHOPAEDIC SURGERY

## 2024-11-04 PROCEDURE — 73030 X-RAY EXAM OF SHOULDER: CPT | Mod: RIGHT SIDE | Performed by: RADIOLOGY

## 2024-11-04 PROCEDURE — 73030 X-RAY EXAM OF SHOULDER: CPT | Mod: RT

## 2024-11-04 PROCEDURE — 73030 X-RAY EXAM OF SHOULDER: CPT | Mod: LT

## 2024-11-04 PROCEDURE — 73030 X-RAY EXAM OF SHOULDER: CPT | Mod: LEFT SIDE | Performed by: RADIOLOGY

## 2024-11-13 ENCOUNTER — OFFICE VISIT (OUTPATIENT)
Dept: PRIMARY CARE | Facility: CLINIC | Age: 54
End: 2024-11-13
Payer: COMMERCIAL

## 2024-11-13 VITALS
WEIGHT: 211 LBS | DIASTOLIC BLOOD PRESSURE: 80 MMHG | HEART RATE: 104 BPM | OXYGEN SATURATION: 94 % | SYSTOLIC BLOOD PRESSURE: 130 MMHG | BODY MASS INDEX: 27.09 KG/M2

## 2024-11-13 DIAGNOSIS — R68.89 FLU-LIKE SYMPTOMS: ICD-10-CM

## 2024-11-13 DIAGNOSIS — J18.0 BRONCHOPNEUMONIA: Primary | ICD-10-CM

## 2024-11-13 LAB
POC RAPID INFLUENZA A: NEGATIVE
POC RAPID INFLUENZA B: NEGATIVE

## 2024-11-13 PROCEDURE — 99213 OFFICE O/P EST LOW 20 MIN: CPT | Performed by: FAMILY MEDICINE

## 2024-11-13 PROCEDURE — 87632 RESP VIRUS 6-11 TARGETS: CPT

## 2024-11-13 PROCEDURE — 87804 INFLUENZA ASSAY W/OPTIC: CPT | Performed by: FAMILY MEDICINE

## 2024-11-13 RX ORDER — ALBUTEROL SULFATE 90 UG/1
2 INHALANT RESPIRATORY (INHALATION) EVERY 4 HOURS PRN
Qty: 8 G | Refills: 5 | Status: SHIPPED | OUTPATIENT
Start: 2024-11-13 | End: 2025-11-13

## 2024-11-13 RX ORDER — PROMETHAZINE HYDROCHLORIDE AND CODEINE PHOSPHATE 6.25; 1 MG/5ML; MG/5ML
5 SOLUTION ORAL EVERY 6 HOURS PRN
Qty: 120 ML | Refills: 1 | Status: SHIPPED | OUTPATIENT
Start: 2024-11-13 | End: 2024-11-20

## 2024-11-13 RX ORDER — CODEINE PHOSPHATE AND GUAIFENESIN 10; 100 MG/5ML; MG/5ML
5 SOLUTION ORAL EVERY 6 HOURS PRN
Qty: 140 ML | Refills: 1 | Status: SHIPPED | OUTPATIENT
Start: 2024-11-13 | End: 2024-11-27

## 2024-11-13 RX ORDER — AMOXICILLIN AND CLAVULANATE POTASSIUM 875; 125 MG/1; MG/1
875 TABLET, FILM COATED ORAL 2 TIMES DAILY
Qty: 20 TABLET | Refills: 0 | Status: SHIPPED | OUTPATIENT
Start: 2024-11-13 | End: 2024-11-23

## 2024-11-13 RX ORDER — METHYLPREDNISOLONE 4 MG/1
TABLET ORAL
Qty: 21 TABLET | Refills: 0 | Status: SHIPPED | OUTPATIENT
Start: 2024-11-13 | End: 2024-11-20

## 2024-11-15 LAB
ADENOVIRUS RVP, VIRC: NOT DETECTED
ENTEROVIRUS/RHINOVIRUS RVP, VIRC: NOT DETECTED
HUMAN BOCAVIRUS RVP, VIRC: NOT DETECTED
HUMAN CORONAVIRUS RVP, VIRC: NOT DETECTED
INFLUENZA A , VIRC: NOT DETECTED
INFLUENZA A H1N1-09 , VIRC: NOT DETECTED
INFLUENZA B PCR, VIRC: NOT DETECTED
METAPNEUMOVIRUS , VIRC: NOT DETECTED
PARAINFLUENZA PCR, VIRC: NOT DETECTED
RSV PCR, RVP, VIRC: POSITIVE

## 2024-11-25 ENCOUNTER — APPOINTMENT (OUTPATIENT)
Dept: PRIMARY CARE | Facility: CLINIC | Age: 54
End: 2024-11-25
Payer: COMMERCIAL

## 2024-11-25 ASSESSMENT — ENCOUNTER SYMPTOMS
JOINT SWELLING: 0
RHINORRHEA: 1
NAUSEA: 0
DECREASED CONCENTRATION: 0
UNEXPECTED WEIGHT CHANGE: 0
BLOOD IN STOOL: 0
CONFUSION: 0
COUGH: 1
DIARRHEA: 0
TROUBLE SWALLOWING: 0
MYALGIAS: 1
PALPITATIONS: 0
FATIGUE: 1
ABDOMINAL PAIN: 0
HEMATURIA: 0
HALLUCINATIONS: 0
SORE THROAT: 1
NUMBNESS: 0
FREQUENCY: 0
SHORTNESS OF BREATH: 0
FEVER: 1
HEADACHES: 0
EYE PAIN: 0
VOMITING: 0
DIZZINESS: 0
DYSURIA: 0
WEAKNESS: 0

## 2024-11-25 NOTE — PATIENT INSTRUCTIONS
Patient acutely ill.  Will order respiratory viral panel as flu is negative.  Will cover for pneumonia with Augmentin.  Given a Medrol Dosepak, albuterol inhaler and codeine cough syrup.  Advised to rest, drink fluids and follow-up here or to the ER if symptoms persist or worsen.  Will notify of results.

## 2024-11-25 NOTE — PROGRESS NOTES
Subjective   Patient ID: Tj Aiken is a 54 y.o. male.    Last week or so patient has been with myalgias, harsh cough, congestion, headache and general just feels poorly.  Subjective temperature.  No dysuria, no nausea, vomiting or diarrhea.  He is not a smoker.  No history of lung disease or asthma.        Review of Systems   Constitutional:  Positive for fatigue and fever. Negative for unexpected weight change.   HENT:  Positive for congestion, postnasal drip, rhinorrhea and sore throat. Negative for ear pain, hearing loss and trouble swallowing.    Eyes:  Negative for pain and visual disturbance.   Respiratory:  Positive for cough. Negative for shortness of breath.    Cardiovascular:  Negative for chest pain, palpitations and leg swelling.   Gastrointestinal:  Negative for abdominal pain, blood in stool, diarrhea, nausea and vomiting.   Genitourinary:  Negative for dysuria, frequency, hematuria and urgency.   Musculoskeletal:  Positive for myalgias. Negative for joint swelling.   Skin:  Negative for pallor and rash.   Neurological:  Negative for dizziness, syncope, weakness, numbness and headaches.   Psychiatric/Behavioral:  Negative for confusion, decreased concentration, hallucinations and suicidal ideas.      Vitals:    11/13/24 1035   BP: 130/80   Pulse: 104   SpO2: 94%      Objective   Physical Exam  Constitutional:       General: He is not in acute distress.     Appearance: He is ill-appearing. He is not toxic-appearing.   HENT:      Head: Normocephalic and atraumatic.      Ears:      Comments: TMs dull bilaterally     Nose: Congestion and rhinorrhea present.      Mouth/Throat:      Mouth: Mucous membranes are moist.      Pharynx: Oropharynx is clear. Posterior oropharyngeal erythema present. No oropharyngeal exudate.   Eyes:      Extraocular Movements: Extraocular movements intact.      Conjunctiva/sclera: Conjunctivae normal.      Pupils: Pupils are equal, round, and reactive to light.    Cardiovascular:      Rate and Rhythm: Tachycardia present.      Heart sounds: Normal heart sounds. No murmur heard.  Pulmonary:      Effort: Pulmonary effort is normal.      Comments: Breath sounds coarse  Musculoskeletal:      Cervical back: Normal range of motion.   Lymphadenopathy:      Cervical: No cervical adenopathy.   Skin:     General: Skin is warm and dry.   Neurological:      General: No focal deficit present.      Mental Status: He is alert.         Assessment/Plan   Diagnoses and all orders for this visit:  Bronchopneumonia  -     methylPREDNISolone (Medrol Dospak) 4 mg tablets; Take as directed on package.  -     amoxicillin-pot clavulanate (Augmentin) 875-125 mg tablet; Take 1 tablet (875 mg) by mouth 2 times a day for 10 days.  -     albuterol (Ventolin HFA) 90 mcg/actuation inhaler; Inhale 2 puffs every 4 hours if needed for wheezing or shortness of breath.  -     promethazine-codeine (Phenergan W/Codeine) 6.25-10 mg/5 mL syrup; Take 5 mL by mouth every 6 hours if needed for cough for up to 7 days.  -     codeine-guaifenesin (Robitussin-AC)  mg/5 mL syrup; Take 5 mL by mouth every 6 hours if needed for cough for up to 14 days.  Flu-like symptoms  -     POCT Influenza A/B manually resulted  -     Respiratory Viral Panel

## 2024-11-29 ENCOUNTER — DOCUMENTATION (OUTPATIENT)
Dept: PHYSICAL THERAPY | Facility: CLINIC | Age: 54
End: 2024-11-29
Payer: COMMERCIAL

## 2024-11-29 NOTE — PROGRESS NOTES
Physical Therapy    Discharge Summary    Name: Tj Aiken  MRN: [id number]  : 1970  Date: 24    Discharge Summary: PT    Discharge Information: Date of discharge 24, Date of last visit 24, and Date of evaluation 24    Therapy Summary: Pt only attended one follow up session, failed to return.     Discharge Status: n/a      Rehab Discharge Reason: Failed to schedule and/or keep follow-up appointment(s)

## 2024-12-06 ENCOUNTER — APPOINTMENT (OUTPATIENT)
Dept: PRIMARY CARE | Facility: CLINIC | Age: 54
End: 2024-12-06
Payer: COMMERCIAL

## 2024-12-13 ENCOUNTER — EVALUATION (OUTPATIENT)
Dept: PHYSICAL THERAPY | Facility: CLINIC | Age: 54
End: 2024-12-13
Payer: COMMERCIAL

## 2024-12-13 DIAGNOSIS — M75.02 ADHESIVE CAPSULITIS OF LEFT SHOULDER: Primary | ICD-10-CM

## 2024-12-13 PROCEDURE — 97110 THERAPEUTIC EXERCISES: CPT | Mod: GP

## 2024-12-13 PROCEDURE — 97140 MANUAL THERAPY 1/> REGIONS: CPT | Mod: GP

## 2024-12-13 PROCEDURE — 97161 PT EVAL LOW COMPLEX 20 MIN: CPT | Mod: GP

## 2024-12-13 ASSESSMENT — ENCOUNTER SYMPTOMS
LOSS OF SENSATION IN FEET: 0
OCCASIONAL FEELINGS OF UNSTEADINESS: 0
DEPRESSION: 0

## 2024-12-13 ASSESSMENT — PAIN SCALES - GENERAL: PAINLEVEL_OUTOF10: 0 - NO PAIN

## 2024-12-13 ASSESSMENT — PAIN DESCRIPTION - DESCRIPTORS: DESCRIPTORS: SHARP

## 2024-12-13 ASSESSMENT — ACTIVITIES OF DAILY LIVING (ADL): ADL_ASSISTANCE: INDEPENDENT

## 2024-12-13 ASSESSMENT — PAIN - FUNCTIONAL ASSESSMENT: PAIN_FUNCTIONAL_ASSESSMENT: 0-10

## 2024-12-13 NOTE — PROGRESS NOTES
Physical Therapy  Physical Therapy Orthopedic Evaluation      Patient Name: Tj Aiken  MRN: [id number]  Today's Date: 12/13/2024  Time Calculation  Start Time: 1017  Stop Time: 1100  Time Calculation (min): 43 min  PT Evaluation Time Entry  PT Evaluation (Low) Time Entry: 16  PT Therapeutic Procedures Time Entry  Manual Therapy Time Entry: 9  Therapeutic Exercise Time Entry: 16       Insurance:  Episode Count: 1  Number of Treatments Authorized: 1/MN  Certification Period Start Date: 12/13/24  Certification Period End Date: 12/13/25  Insurance Type: Payor: [employer] [facility] / Plan: [employer] / Product Type: *No Product type* /     Current Problem  1. Adhesive capsulitis of left shoulder  Follow Up In Physical Therapy    Follow Up In Physical Therapy          Problem List Items Addressed This Visit             ICD-10-CM    Adhesive capsulitis of left shoulder - Primary M75.02    Relevant Orders    Follow Up In Physical Therapy        General:  Reason for Referral: L Shoulder Adhesive Capsulitis  Referred By: Dr. Christian Gomez    Past Medical History  Past Medical History Relevant to Rehab: h/o R RTC tear    Precautions:   Precautions  STEADI Fall Risk Score (The score of 4 or more indicates an increased risk of falling): 0  Precautions Comment: None    Medical History Form: Reviewed (scanned into chart)    Subjective:   Subjective   General Comment: Patient presents to the PT clinic with chief complaint of L shoulder pain secondary to adhesive capsulitis. He reports that this began in July 2024 when his shoulder started to become very sore. He denies any INGA. He recieved a steroid injection around that time and was treated without much improvement in symptoms. He later then was referred to Dr. Gomez who dx him with adhesive capsulitis. Patient reports that he recieved another steroid injection (11/4/24). He states that his pain has been improving, but his shoulder is getting  "stiffer. He works as a  and has trouble with reaching in multiple planes.    Patient's Goal(s) for Therapy: To regain mobility of his shoulder    Onset Date: Onset Date: 07/01/24    Current Condition:   Better    Prior Functional Level: Prior Function Per Pt/Caregiver Report  Level of [place]: Independent with ADLs and functional transfers  ADL Assistance: Independent  Homemaking Assistance: Independent  Ambulatory Assistance: Independent  Vocational: Full time employment ()    Pain:  Pain Assessment: 0-10  0-10 (Numeric) Pain Score: 0 - No pain (5/10 at worst)  Pain Type: Chronic pain  Pain Location: Shoulder  Pain Orientation: Left (Anteriolateral shoulder)  Pain Descriptors: Sharp (\"catching\")  Pain Frequency: Intermittent  Pain Onset: Gradual  Clinical Progression: Gradually improving  Patient's Stated Pain Goal: No pain  Aggravating Factors: Reaching behind his back, reaching overhead, lifting any weight over head  Alleviating Factors: Steroid shot helped    Previous Interventions/Treatments/Previous Tests & Imaging: Physical Therapy      Patients Living Environment: Home Living Comment: Multilevel home    Primary Language: English    Red Flags: Do you have any of the following?         Red Flags: None    Objective:  Objective   Shoulder  Functional Rating Scale  Quick Dash: 31.8  Observation  Cervical Posture: Slight forward head  Shoulder/Scapular/Rib postion: Flat thoracic spine, mild rounded shoulders  Shoulder Observation Comment: Unremarkable  Shoulder palpation/Joint Assessment  Shoulder Palpation/Joint Mobility Comment: Hypomobile A-P and Inferior GHJ glides, no tenderness throughout L shoulder sammy prominence or musculature  Cervical AROM  Cervical AROM WFL: yes  Shoulder AROM  R Shoulder flexion: (180°): 175  L Shoulder flexion: (180°): 115 - pain  R Shoulder Extension: (60°): 80  L Shoulder Extension: (60°): 44 - stiff  R shoulder abduction: (180°): 180  L Shoulder " "abduction: (180°): 72 - pain  R Shoulder ER: (90°): 82 (tested at 90d abd) (Functional Reach: T4)  L shoulder ER: (90°): 29 (tested at 0d abduction) (Functional Reach: Top of head)  R shoulder IR: (70°): 81 (Functional Reach: T8)  L shoulder IR: (70°): L4  Shoulder PROM  L shoulder flexion: (180°): 120 - pain  L shoulder abduction: (180°): 90 - pain  L shoulder ER: (90°): 25 - pain  Cervical Myotomes (MMT  Cervical Myotomes (MMT) WFL: yes  Shoulder Strength  R shoulder flexion: (5/5): 5/5  L shoulder flexion: (5/5): 3-/5  R shoulder abduction: (5/5): 5/5  L shoulder abduction: (5/5): 3-/5  R shoulder ER: (5/5): 5/5  L shoulder ER: (5/5): 3-/5  R shoulder IR: (5/5) : 5/5  L shoulder IR: (5/5): 4+/5  Scapular MMT  Scapular MMT WFL:  (NT due to patient unable to achieve testing position)  DTR   DTR WFL: yes  Shoulder Special  UE Special Tests Comments: NT due to patient unable to attain testing positions    Outcome Measures:  Other Measures  Other Outcome Measures: QuickDASH: 31.8     Treatment Performed:  Therapeutic Exercise  Therapeutic Exercise Activity 1: Supine AAROM Dowel Flexion, 5\" hold x10  Therapeutic Exercise Activity 2: Supine AAROM with dowel ER, 5\" hold x10  Therapeutic Exercise Activity 3: Seated AAROM with dowel Abd, 5\" hold x10  Therapeutic Exercise Activity 4: Towel IR stretch, 4x30\" hold  Therapeutic Exercise Activity 5: Standing ER at door stretch, 3x30\" hold  Therapeutic Exercise Activity 6: Education: pathology of frozen shoulder and the different phases  Therapeutic Exercise Activity 7: Education: FITT principles of HEP with patient demonstrating understanding    Manual Therapy  Manual Therapy Activity 1: L GHJ A-P glide, grade 3 osc  Manual Therapy Activity 2: L GHJ Inf glide, grade 3 osc      Outpatient Education  Individual(s) Educated: Patient  Education Provided: Home Exercise Program, Anatomy, Physiology  Risk and Benefits Discussed with Patient/Caregiver/Other: yes  Patient/Caregiver " Demonstrated Understanding: yes  Plan of Care Discussed and Agreed Upon: yes  Patient Response to Education: Patient/Caregiver Verbalized Understanding of Information  Education Comment: Access Code: [email or web address]  URL: [email or web address]  Date: 12/13/2024  Prepared by: Kirk Cosme    Exercises  - Supine Shoulder Flexion Extension AAROM with Dowel  - 2-3 x daily - 7 x weekly - 2 sets - 10 reps - 5 sec hold  - Supine Shoulder External Rotation with Dowel  - 2-3 x daily - 7 x weekly - 2 sets - 10 reps - 5 sec hold  - Standing Shoulder Abduction AAROM with Dowel  - 2-3 x daily - 7 x weekly - 2 sets - 10 reps - 5 sec hold  - Standing Shoulder Internal Rotation Stretch with Towel  - 2-3 x daily - 7 x weekly - 3-5 sets - 15-30 sec hold  - Standing Shoulder External Rotation Stretch in Doorway  - 2-3 x daily - 7 x weekly - 3-5 sets - 15-30 sec hold    Assessment:   Tj Aiken is a 54 y.o. who presents to physical therapy with signs and symptoms consistent with L GHJ hypomobility movement system impairment and L shoulder adhesive capsulitis medical diagnosis with low to moderate irritability. They demonstrate impaired L shoulder ROM, GHJ mobility, L UE strength, pain, and movement patterning. Patient's limitations prevent them from participating in typical ADLs and performing desired functional activities. Patient would benefit from skilled physical therapy in order to address the stated deficits and return to daily tasks with reduced pain and improved function.    Personal Factors Affecting Care:   None    Stability: Stable and/or uncomplicated characteristics    PT Assessment Results: Decreased strength, Decreased range of motion, Decreased mobility, Pain  Rehab Prognosis: Good    Plan:  Treatment/Interventions: Blood flow restriction therapy, Cryotherapy, Education/ Instruction, Electrical stimulation, Hot pack, Manual therapy, Neuromuscular re-education, Self care/ home management, Taping  techniques, Therapeutic activities, Therapeutic exercises, Vasopneumatic device  PT Plan: Skilled PT  PT Frequency: Other (Comment) (1-2x per week)  Duration: 6-8 weeks  Onset Date: 07/01/24  Rehab Potential: Good    Goals: Set and discussed today  Active       PT Problem       PT Goal 1       Start:  12/13/24    Expected End:  01/10/25       Short Term Goals  1.Patient will demonstrate adherence to their HEP in order to maintain PT progress in 2 weeks.  2.Patient will demonstrate >150 degrees in active L shoulder flexion ROM in order to have the mobility necessary for functional tasks in 4 weeks.  3.Patient will report <3/10 pain at worst in order to demonstrate reduced symptoms in 4 weeks.         PT Goal 2       Start:  12/13/24    Expected End:  02/07/25       Long Term Goals:  1.Patient will report <20% disability on the QuickDASH outcome measure in 6 weeks.  2.Patient will demonstrate 5/5 MMT score on all shoulder musculature to demonstrate increased strength needed for functional tasks in 8 weeks.  3.Patient will be able to perform OH press with 10# x10 repetitions without pain in order to have the strength/endurance needed for daily tasks/work in 8 weeks.               Plan of care was developed with input and agreement by the patient         Screening  Frequency  Date Last Completed   Spiritual and Cultural Beliefs   Screening each visit or episode of care    Falls Risk Screening every ambulatory visit 12/13/2024 11:53 AM   Pain Screening annually at primary care visit  8/22/2024   Domestic Violence screening annually at primary care visit    Depression Screening annually in the primary care setting 8/22/2024   Suicide Risk Screening annually in the primary care setting    Nutrition and Food Insecurity   Screening at least annually at primary care visit     Key Learner annually in the primary care setting    Drug Screen     Alcohol Screen     Advance Directive           Kirk Cosme, PT

## 2025-01-03 ENCOUNTER — APPOINTMENT (OUTPATIENT)
Dept: PHYSICAL THERAPY | Facility: CLINIC | Age: 55
End: 2025-01-03
Payer: COMMERCIAL

## 2025-01-03 DIAGNOSIS — M75.02 ADHESIVE CAPSULITIS OF LEFT SHOULDER: Primary | ICD-10-CM

## 2025-01-10 ENCOUNTER — APPOINTMENT (OUTPATIENT)
Dept: PHYSICAL THERAPY | Facility: CLINIC | Age: 55
End: 2025-01-10
Payer: COMMERCIAL

## 2025-01-10 DIAGNOSIS — M75.02 ADHESIVE CAPSULITIS OF LEFT SHOULDER: Primary | ICD-10-CM

## 2025-01-16 ENCOUNTER — APPOINTMENT (OUTPATIENT)
Dept: PHYSICAL THERAPY | Facility: CLINIC | Age: 55
End: 2025-01-16
Payer: COMMERCIAL

## 2025-01-16 DIAGNOSIS — M75.02 ADHESIVE CAPSULITIS OF LEFT SHOULDER: Primary | ICD-10-CM

## 2025-01-24 ENCOUNTER — APPOINTMENT (OUTPATIENT)
Dept: PHYSICAL THERAPY | Facility: CLINIC | Age: 55
End: 2025-01-24
Payer: COMMERCIAL

## 2025-01-24 DIAGNOSIS — M75.02 ADHESIVE CAPSULITIS OF LEFT SHOULDER: Primary | ICD-10-CM

## 2025-01-31 ENCOUNTER — APPOINTMENT (OUTPATIENT)
Dept: PHYSICAL THERAPY | Facility: CLINIC | Age: 55
End: 2025-01-31
Payer: COMMERCIAL

## 2025-01-31 DIAGNOSIS — M75.02 ADHESIVE CAPSULITIS OF LEFT SHOULDER: Primary | ICD-10-CM

## 2025-02-03 ENCOUNTER — APPOINTMENT (OUTPATIENT)
Dept: ORTHOPEDIC SURGERY | Facility: CLINIC | Age: 55
End: 2025-02-03
Payer: COMMERCIAL